# Patient Record
Sex: MALE | Race: WHITE | URBAN - METROPOLITAN AREA
[De-identification: names, ages, dates, MRNs, and addresses within clinical notes are randomized per-mention and may not be internally consistent; named-entity substitution may affect disease eponyms.]

---

## 2018-01-27 PROCEDURE — 99282 EMERGENCY DEPT VISIT SF MDM: CPT

## 2018-01-28 ENCOUNTER — HOSPITAL ENCOUNTER (EMERGENCY)
Facility: CLINIC | Age: 58
Discharge: HOME | End: 2018-01-28
Attending: EMERGENCY MEDICINE | Admitting: EMERGENCY MEDICINE
Payer: COMMERCIAL

## 2018-01-28 ENCOUNTER — NURSE TRIAGE (OUTPATIENT)
Dept: NURSING | Facility: CLINIC | Age: 58
End: 2018-01-28

## 2018-01-28 ENCOUNTER — ANESTHESIA EVENT (OUTPATIENT)
Dept: SURGERY | Facility: CLINIC | Age: 58
End: 2018-01-28
Payer: COMMERCIAL

## 2018-01-28 ENCOUNTER — HOSPITAL ENCOUNTER (OUTPATIENT)
Facility: CLINIC | Age: 58
Setting detail: OBSERVATION
Discharge: HOME | End: 2018-01-29
Attending: EMERGENCY MEDICINE | Admitting: UROLOGY
Payer: COMMERCIAL

## 2018-01-28 ENCOUNTER — ANESTHESIA (OUTPATIENT)
Dept: SURGERY | Facility: CLINIC | Age: 58
End: 2018-01-28
Payer: COMMERCIAL

## 2018-01-28 VITALS
OXYGEN SATURATION: 98 % | TEMPERATURE: 96.8 F | BODY MASS INDEX: 28 KG/M2 | SYSTOLIC BLOOD PRESSURE: 122 MMHG | DIASTOLIC BLOOD PRESSURE: 84 MMHG | WEIGHT: 200 LBS | RESPIRATION RATE: 16 BRPM | HEIGHT: 71 IN

## 2018-01-28 DIAGNOSIS — S39.840A PENILE FRACTURE, INITIAL ENCOUNTER: Primary | ICD-10-CM

## 2018-01-28 DIAGNOSIS — S39.94XA PENIS INJURY, INITIAL ENCOUNTER: ICD-10-CM

## 2018-01-28 DIAGNOSIS — S39.840A: ICD-10-CM

## 2018-01-28 LAB
ALBUMIN UR-MCNC: NEGATIVE MG/DL
ANION GAP SERPL CALCULATED.3IONS-SCNC: 5 MMOL/L (ref 3–14)
APPEARANCE UR: CLEAR
BASOPHILS # BLD AUTO: 0 10E9/L (ref 0–0.2)
BASOPHILS NFR BLD AUTO: 0.6 %
BILIRUB UR QL STRIP: NEGATIVE
BUN SERPL-MCNC: 20 MG/DL (ref 7–30)
CALCIUM SERPL-MCNC: 8.7 MG/DL (ref 8.5–10.1)
CHLORIDE SERPL-SCNC: 106 MMOL/L (ref 94–109)
CO2 SERPL-SCNC: 29 MMOL/L (ref 20–32)
COLOR UR AUTO: YELLOW
CREAT SERPL-MCNC: 0.93 MG/DL (ref 0.66–1.25)
DIFFERENTIAL METHOD BLD: NORMAL
EOSINOPHIL # BLD AUTO: 0.2 10E9/L (ref 0–0.7)
EOSINOPHIL NFR BLD AUTO: 2.2 %
ERYTHROCYTE [DISTWIDTH] IN BLOOD BY AUTOMATED COUNT: 12.7 % (ref 10–15)
GFR SERPL CREATININE-BSD FRML MDRD: 83 ML/MIN/1.7M2
GLUCOSE SERPL-MCNC: 98 MG/DL (ref 70–99)
GLUCOSE UR STRIP-MCNC: NEGATIVE MG/DL
HCT VFR BLD AUTO: 44 % (ref 40–53)
HGB BLD-MCNC: 15.2 G/DL (ref 13.3–17.7)
HGB UR QL STRIP: NEGATIVE
IMM GRANULOCYTES # BLD: 0 10E9/L (ref 0–0.4)
IMM GRANULOCYTES NFR BLD: 0.1 %
INR PPP: 0.95 (ref 0.86–1.14)
INTERPRETATION ECG - MUSE: NORMAL
KETONES UR STRIP-MCNC: ABNORMAL MG/DL
LEUKOCYTE ESTERASE UR QL STRIP: NEGATIVE
LYMPHOCYTES # BLD AUTO: 2.9 10E9/L (ref 0.8–5.3)
LYMPHOCYTES NFR BLD AUTO: 40.5 %
MCH RBC QN AUTO: 31.3 PG (ref 26.5–33)
MCHC RBC AUTO-ENTMCNC: 34.5 G/DL (ref 31.5–36.5)
MCV RBC AUTO: 91 FL (ref 78–100)
MONOCYTES # BLD AUTO: 0.6 10E9/L (ref 0–1.3)
MONOCYTES NFR BLD AUTO: 8.5 %
NEUTROPHILS # BLD AUTO: 3.4 10E9/L (ref 1.6–8.3)
NEUTROPHILS NFR BLD AUTO: 48.1 %
NITRATE UR QL: NEGATIVE
NRBC # BLD AUTO: 0 10*3/UL
NRBC BLD AUTO-RTO: 0 /100
PH UR STRIP: 6.5 PH (ref 5–7)
PLATELET # BLD AUTO: 293 10E9/L (ref 150–450)
POTASSIUM SERPL-SCNC: 4.1 MMOL/L (ref 3.4–5.3)
RBC # BLD AUTO: 4.85 10E12/L (ref 4.4–5.9)
SODIUM SERPL-SCNC: 140 MMOL/L (ref 133–144)
SOURCE: ABNORMAL
SP GR UR STRIP: 1.02 (ref 1–1.03)
UROBILINOGEN UR STRIP-ACNC: 1 EU/DL (ref 0.2–1)
WBC # BLD AUTO: 7.1 10E9/L (ref 4–11)

## 2018-01-28 PROCEDURE — 40000065 ZZH STATISTIC EKG NON-CHARGEABLE

## 2018-01-28 PROCEDURE — 36000063 ZZH SURGERY LEVEL 4 EA 15 ADDTL MIN: Performed by: UROLOGY

## 2018-01-28 PROCEDURE — 37000009 ZZH ANESTHESIA TECHNICAL FEE, EACH ADDTL 15 MIN: Performed by: UROLOGY

## 2018-01-28 PROCEDURE — 25000125 ZZHC RX 250: Performed by: NURSE ANESTHETIST, CERTIFIED REGISTERED

## 2018-01-28 PROCEDURE — 85025 COMPLETE CBC W/AUTO DIFF WBC: CPT | Performed by: EMERGENCY MEDICINE

## 2018-01-28 PROCEDURE — 25000128 H RX IP 250 OP 636: Performed by: UROLOGY

## 2018-01-28 PROCEDURE — 99220 ZZC INITIAL OBSERVATION CARE,LEVL III: CPT | Performed by: INTERNAL MEDICINE

## 2018-01-28 PROCEDURE — 99285 EMERGENCY DEPT VISIT HI MDM: CPT

## 2018-01-28 PROCEDURE — 25000128 H RX IP 250 OP 636: Performed by: ANESTHESIOLOGY

## 2018-01-28 PROCEDURE — 71000012 ZZH RECOVERY PHASE 1 LEVEL 1 FIRST HR: Performed by: UROLOGY

## 2018-01-28 PROCEDURE — 25000566 ZZH SEVOFLURANE, EA 15 MIN: Performed by: UROLOGY

## 2018-01-28 PROCEDURE — 25000128 H RX IP 250 OP 636: Performed by: NURSE ANESTHETIST, CERTIFIED REGISTERED

## 2018-01-28 PROCEDURE — 80048 BASIC METABOLIC PNL TOTAL CA: CPT | Performed by: EMERGENCY MEDICINE

## 2018-01-28 PROCEDURE — G0378 HOSPITAL OBSERVATION PER HR: HCPCS

## 2018-01-28 PROCEDURE — 36000093 ZZH SURGERY LEVEL 4 1ST 30 MIN: Performed by: UROLOGY

## 2018-01-28 PROCEDURE — 27210995 ZZH RX 272: Performed by: UROLOGY

## 2018-01-28 PROCEDURE — 40000169 ZZH STATISTIC PRE-PROCEDURE ASSESSMENT I: Performed by: UROLOGY

## 2018-01-28 PROCEDURE — 27210794 ZZH OR GENERAL SUPPLY STERILE: Performed by: UROLOGY

## 2018-01-28 PROCEDURE — 81003 URINALYSIS AUTO W/O SCOPE: CPT | Performed by: UROLOGY

## 2018-01-28 PROCEDURE — 37000008 ZZH ANESTHESIA TECHNICAL FEE, 1ST 30 MIN: Performed by: UROLOGY

## 2018-01-28 PROCEDURE — 93005 ELECTROCARDIOGRAM TRACING: CPT

## 2018-01-28 PROCEDURE — 85610 PROTHROMBIN TIME: CPT | Performed by: EMERGENCY MEDICINE

## 2018-01-28 PROCEDURE — 25000125 ZZHC RX 250: Performed by: UROLOGY

## 2018-01-28 RX ORDER — NALOXONE HYDROCHLORIDE 0.4 MG/ML
.1-.4 INJECTION, SOLUTION INTRAMUSCULAR; INTRAVENOUS; SUBCUTANEOUS
Status: DISCONTINUED | OUTPATIENT
Start: 2018-01-28 | End: 2018-01-29

## 2018-01-28 RX ORDER — SODIUM CHLORIDE, SODIUM LACTATE, POTASSIUM CHLORIDE, CALCIUM CHLORIDE 600; 310; 30; 20 MG/100ML; MG/100ML; MG/100ML; MG/100ML
INJECTION, SOLUTION INTRAVENOUS CONTINUOUS PRN
Status: DISCONTINUED | OUTPATIENT
Start: 2018-01-28 | End: 2018-01-28

## 2018-01-28 RX ORDER — LABETALOL HYDROCHLORIDE 5 MG/ML
10 INJECTION, SOLUTION INTRAVENOUS
Status: DISCONTINUED | OUTPATIENT
Start: 2018-01-28 | End: 2018-01-28 | Stop reason: HOSPADM

## 2018-01-28 RX ORDER — ONDANSETRON 4 MG/1
4 TABLET, ORALLY DISINTEGRATING ORAL EVERY 6 HOURS PRN
Status: DISCONTINUED | OUTPATIENT
Start: 2018-01-28 | End: 2018-01-29 | Stop reason: HOSPADM

## 2018-01-28 RX ORDER — AMOXICILLIN 250 MG
1 CAPSULE ORAL 2 TIMES DAILY PRN
Status: DISCONTINUED | OUTPATIENT
Start: 2018-01-28 | End: 2018-01-29 | Stop reason: HOSPADM

## 2018-01-28 RX ORDER — AMOXICILLIN 250 MG
2 CAPSULE ORAL 2 TIMES DAILY PRN
Status: DISCONTINUED | OUTPATIENT
Start: 2018-01-28 | End: 2018-01-29 | Stop reason: HOSPADM

## 2018-01-28 RX ORDER — PROCHLORPERAZINE 25 MG
25 SUPPOSITORY, RECTAL RECTAL EVERY 12 HOURS PRN
Status: DISCONTINUED | OUTPATIENT
Start: 2018-01-28 | End: 2018-01-29 | Stop reason: HOSPADM

## 2018-01-28 RX ORDER — ONDANSETRON 4 MG/1
4 TABLET, ORALLY DISINTEGRATING ORAL EVERY 6 HOURS PRN
Status: DISCONTINUED | OUTPATIENT
Start: 2018-01-28 | End: 2018-01-28

## 2018-01-28 RX ORDER — PROPOFOL 10 MG/ML
INJECTION, EMULSION INTRAVENOUS PRN
Status: DISCONTINUED | OUTPATIENT
Start: 2018-01-28 | End: 2018-01-28

## 2018-01-28 RX ORDER — GINSENG 100 MG
CAPSULE ORAL PRN
Status: DISCONTINUED | OUTPATIENT
Start: 2018-01-28 | End: 2018-01-28 | Stop reason: HOSPADM

## 2018-01-28 RX ORDER — ONDANSETRON 2 MG/ML
4 INJECTION INTRAMUSCULAR; INTRAVENOUS EVERY 6 HOURS PRN
Status: DISCONTINUED | OUTPATIENT
Start: 2018-01-28 | End: 2018-01-28

## 2018-01-28 RX ORDER — FENTANYL CITRATE 50 UG/ML
INJECTION, SOLUTION INTRAMUSCULAR; INTRAVENOUS PRN
Status: DISCONTINUED | OUTPATIENT
Start: 2018-01-28 | End: 2018-01-28

## 2018-01-28 RX ORDER — LIDOCAINE HYDROCHLORIDE 20 MG/ML
INJECTION, SOLUTION INFILTRATION; PERINEURAL PRN
Status: DISCONTINUED | OUTPATIENT
Start: 2018-01-28 | End: 2018-01-28

## 2018-01-28 RX ORDER — OXYCODONE AND ACETAMINOPHEN 5; 325 MG/1; MG/1
1-2 TABLET ORAL EVERY 4 HOURS PRN
Status: DISCONTINUED | OUTPATIENT
Start: 2018-01-28 | End: 2018-01-29 | Stop reason: HOSPADM

## 2018-01-28 RX ORDER — MAGNESIUM HYDROXIDE 1200 MG/15ML
LIQUID ORAL PRN
Status: DISCONTINUED | OUTPATIENT
Start: 2018-01-28 | End: 2018-01-28 | Stop reason: HOSPADM

## 2018-01-28 RX ORDER — NALOXONE HYDROCHLORIDE 0.4 MG/ML
.1-.4 INJECTION, SOLUTION INTRAMUSCULAR; INTRAVENOUS; SUBCUTANEOUS
Status: DISCONTINUED | OUTPATIENT
Start: 2018-01-28 | End: 2018-01-28

## 2018-01-28 RX ORDER — NALOXONE HYDROCHLORIDE 0.4 MG/ML
.1-.4 INJECTION, SOLUTION INTRAMUSCULAR; INTRAVENOUS; SUBCUTANEOUS
Status: DISCONTINUED | OUTPATIENT
Start: 2018-01-28 | End: 2018-01-29 | Stop reason: HOSPADM

## 2018-01-28 RX ORDER — ONDANSETRON 2 MG/ML
4 INJECTION INTRAMUSCULAR; INTRAVENOUS EVERY 30 MIN PRN
Status: DISCONTINUED | OUTPATIENT
Start: 2018-01-28 | End: 2018-01-28 | Stop reason: HOSPADM

## 2018-01-28 RX ORDER — FENTANYL CITRATE 0.05 MG/ML
25-50 INJECTION, SOLUTION INTRAMUSCULAR; INTRAVENOUS EVERY 5 MIN PRN
Status: DISCONTINUED | OUTPATIENT
Start: 2018-01-28 | End: 2018-01-28 | Stop reason: HOSPADM

## 2018-01-28 RX ORDER — OXYCODONE AND ACETAMINOPHEN 5; 325 MG/1; MG/1
1-2 TABLET ORAL EVERY 4 HOURS PRN
Status: DISCONTINUED | OUTPATIENT
Start: 2018-01-28 | End: 2018-01-28

## 2018-01-28 RX ORDER — ONDANSETRON 4 MG/1
4 TABLET, ORALLY DISINTEGRATING ORAL EVERY 30 MIN PRN
Status: DISCONTINUED | OUTPATIENT
Start: 2018-01-28 | End: 2018-01-28 | Stop reason: HOSPADM

## 2018-01-28 RX ORDER — DEXAMETHASONE SODIUM PHOSPHATE 4 MG/ML
INJECTION, SOLUTION INTRA-ARTICULAR; INTRALESIONAL; INTRAMUSCULAR; INTRAVENOUS; SOFT TISSUE PRN
Status: DISCONTINUED | OUTPATIENT
Start: 2018-01-28 | End: 2018-01-28

## 2018-01-28 RX ORDER — LIDOCAINE 40 MG/G
CREAM TOPICAL
Status: DISCONTINUED | OUTPATIENT
Start: 2018-01-28 | End: 2018-01-29 | Stop reason: HOSPADM

## 2018-01-28 RX ORDER — HYDROMORPHONE HYDROCHLORIDE 1 MG/ML
0.2 INJECTION, SOLUTION INTRAMUSCULAR; INTRAVENOUS; SUBCUTANEOUS
Status: DISCONTINUED | OUTPATIENT
Start: 2018-01-28 | End: 2018-01-29 | Stop reason: HOSPADM

## 2018-01-28 RX ORDER — BUPIVACAINE HYDROCHLORIDE 2.5 MG/ML
INJECTION, SOLUTION INFILTRATION; PERINEURAL PRN
Status: DISCONTINUED | OUTPATIENT
Start: 2018-01-28 | End: 2018-01-28 | Stop reason: HOSPADM

## 2018-01-28 RX ORDER — SODIUM CHLORIDE, SODIUM LACTATE, POTASSIUM CHLORIDE, CALCIUM CHLORIDE 600; 310; 30; 20 MG/100ML; MG/100ML; MG/100ML; MG/100ML
INJECTION, SOLUTION INTRAVENOUS CONTINUOUS
Status: DISCONTINUED | OUTPATIENT
Start: 2018-01-28 | End: 2018-01-28 | Stop reason: HOSPADM

## 2018-01-28 RX ORDER — ONDANSETRON 2 MG/ML
INJECTION INTRAMUSCULAR; INTRAVENOUS PRN
Status: DISCONTINUED | OUTPATIENT
Start: 2018-01-28 | End: 2018-01-28

## 2018-01-28 RX ORDER — PROCHLORPERAZINE MALEATE 10 MG
10 TABLET ORAL EVERY 6 HOURS PRN
Status: DISCONTINUED | OUTPATIENT
Start: 2018-01-28 | End: 2018-01-29 | Stop reason: HOSPADM

## 2018-01-28 RX ORDER — ONDANSETRON 2 MG/ML
4 INJECTION INTRAMUSCULAR; INTRAVENOUS EVERY 6 HOURS PRN
Status: DISCONTINUED | OUTPATIENT
Start: 2018-01-28 | End: 2018-01-29 | Stop reason: HOSPADM

## 2018-01-28 RX ORDER — HYDROMORPHONE HYDROCHLORIDE 1 MG/ML
.3-.5 INJECTION, SOLUTION INTRAMUSCULAR; INTRAVENOUS; SUBCUTANEOUS EVERY 5 MIN PRN
Status: DISCONTINUED | OUTPATIENT
Start: 2018-01-28 | End: 2018-01-28 | Stop reason: HOSPADM

## 2018-01-28 RX ORDER — HYDROMORPHONE HYDROCHLORIDE 1 MG/ML
.3-.5 INJECTION, SOLUTION INTRAMUSCULAR; INTRAVENOUS; SUBCUTANEOUS
Status: DISCONTINUED | OUTPATIENT
Start: 2018-01-28 | End: 2018-01-28

## 2018-01-28 RX ADMIN — FENTANYL CITRATE 50 MCG: 50 INJECTION, SOLUTION INTRAMUSCULAR; INTRAVENOUS at 20:17

## 2018-01-28 RX ADMIN — ONDANSETRON 4 MG: 2 INJECTION INTRAMUSCULAR; INTRAVENOUS at 20:25

## 2018-01-28 RX ADMIN — HYDROMORPHONE HYDROCHLORIDE 0.5 MG: 1 INJECTION, SOLUTION INTRAMUSCULAR; INTRAVENOUS; SUBCUTANEOUS at 20:43

## 2018-01-28 RX ADMIN — SUCCINYLCHOLINE CHLORIDE 100 MG: 20 INJECTION, SOLUTION INTRAMUSCULAR; INTRAVENOUS at 20:17

## 2018-01-28 RX ADMIN — FENTANYL CITRATE 50 MCG: 50 INJECTION, SOLUTION INTRAMUSCULAR; INTRAVENOUS at 20:28

## 2018-01-28 RX ADMIN — PROPOFOL 200 MG: 10 INJECTION, EMULSION INTRAVENOUS at 20:17

## 2018-01-28 RX ADMIN — CEFAZOLIN SODIUM 2 G: 2 SOLUTION INTRAVENOUS at 20:19

## 2018-01-28 RX ADMIN — DEXMEDETOMIDINE HYDROCHLORIDE 12 MCG: 100 INJECTION, SOLUTION INTRAVENOUS at 21:33

## 2018-01-28 RX ADMIN — LIDOCAINE HYDROCHLORIDE 100 MG: 20 INJECTION, SOLUTION INFILTRATION; PERINEURAL at 20:17

## 2018-01-28 RX ADMIN — SODIUM CHLORIDE, POTASSIUM CHLORIDE, SODIUM LACTATE AND CALCIUM CHLORIDE: 600; 310; 30; 20 INJECTION, SOLUTION INTRAVENOUS at 20:02

## 2018-01-28 RX ADMIN — MIDAZOLAM 2 MG: 1 INJECTION INTRAMUSCULAR; INTRAVENOUS at 19:59

## 2018-01-28 RX ADMIN — SODIUM CHLORIDE, POTASSIUM CHLORIDE, SODIUM LACTATE AND CALCIUM CHLORIDE: 600; 310; 30; 20 INJECTION, SOLUTION INTRAVENOUS at 21:01

## 2018-01-28 RX ADMIN — DEXAMETHASONE SODIUM PHOSPHATE 4 MG: 4 INJECTION, SOLUTION INTRA-ARTICULAR; INTRALESIONAL; INTRAMUSCULAR; INTRAVENOUS; SOFT TISSUE at 20:25

## 2018-01-28 ASSESSMENT — LIFESTYLE VARIABLES: TOBACCO_USE: 1

## 2018-01-28 NOTE — PLAN OF CARE
RECEIVING UNIT ED HANDOFF REVIEW    ED Nurse Handoff Report was reviewed by: Hailey Stein on January 28, 2018 at 5:39 PM

## 2018-01-28 NOTE — ED NOTES
"[facility]  ED Nurse Handoff Report    ED Chief complaint: Groin Swelling (here this morning for the same thing. )      ED Diagnosis:   Final diagnoses:   Penile fracture       Code Status: Full Code    Allergies: No Known Allergies    Activity level - Baseline/Home:  Independent    Activity Level - Current:   Independent     Needed?: No    Isolation: No  Infection: Not Applicable    Bariatric?: No    Vital Signs:   Vitals:    01/28/18 1328   BP: (!) 161/98   Pulse: 77   Resp: 18   Temp: 98.2  F (36.8  C)   TempSrc: Oral   SpO2: 98%   Weight: 90.7 kg (200 lb)   Height: 1.803 m (5' 11\")       Cardiac Rhythm: ,        Pain level: 0-10 Pain Scale: 1    Is this patient confused?: No    Patient Report: Initial Complaint: Abigail Whitley is a 57 year old male with a history of HTN and ingunial hernia who presents to the emergency department for evaluation of groin swelling and penial pain. At approximately 2230 last night, the patient reports he was having intercourse with his girlfriend. While having an erection with his partner on top, the patient reports his penis bent. He states the immediate onset of penial pain and swelling and says they stopped intercourse immediately. This event prompted the patient to seek evaluation here in the ED last night. During that visit, the patient was treated for pain and giving information to follow up with urology. This morning the patient reports he tried calling the urologist and learned he could not be seen until Monday at the earliest. Having read online that his diagnosis should be surgically corrected within 48 hours, the patient decided to seek further evaluation here in the emergency department.        Here, the patient continues to complain of penial pain and swelling. He denies having an erection since the event last night. He reports he periodically uses Viagra for \"insurance\" but denies needing to rely on it. He denies the use of blood thinners " and other major medical issues.   Focused Assessment: reports groin area pain, lungs clear, A/O x4  Tests Performed: labs, eval by urologist  Abnormal Results:   Results for orders placed or performed during the hospital encounter of 01/28/18 (from the past 24 hour(s))   *UA reflex to Microscopic   Result Value Ref Range    Color Urine Yellow     Appearance Urine Clear     Glucose Urine Negative NEG^Negative mg/dL    Bilirubin Urine Negative NEG^Negative    Ketones Urine Trace (A) NEG^Negative mg/dL    Specific Gravity Urine 1.025 1.003 - 1.035    Blood Urine Negative NEG^Negative    pH Urine 6.5 5.0 - 7.0 pH    Protein Albumin Urine Negative NEG^Negative mg/dL    Urobilinogen Urine 1.0 0.2 - 1.0 EU/dL    Nitrite Urine Negative NEG^Negative    Leukocyte Esterase Urine Negative NEG^Negative    Source Midstream Urine      Treatments provided: IV placed. 20 ga right AC. Plan is for patient to go to OBS first and then to surgery at 20:00    Family Comments: none here    OBS brochure/video discussed/provided to patient: Yes    ED Medications: Medications - No data to display    Drips infusing?:  No      ED NURSE PHONE NUMBER: *[id number]

## 2018-01-28 NOTE — ED NOTES
Pt has swelling and discoloration to the penis. In the past few minutes pt was able to urinate without difficulty states the urine color was normal. MD at bedside for exam. Pt instructed to follow-up on Monday with urology and return if swelling or difficulty urinating occurs.

## 2018-01-28 NOTE — ED PROVIDER NOTES
"  History     Chief Complaint:  Groin Swelling    HPI   Abigail Whitley is a 57 year old male with a history of HTN and inguinal hernia who presents to the emergency department for evaluation of groin swelling and penial pain. At approximately 2230 last night, the patient reports he was having intercourse with his girlfriend. While having an erection with his partner on top, the patient reports his penis bent. He states the immediate onset of penial pain and swelling and says they stopped intercourse immediately. This event prompted the patient to seek evaluation here in the ED last night. During that visit, the patient was treated for pain and giving information to follow up with urology. This morning the patient reports he tried calling the urologist and learned he could not be seen until Monday at the earliest. Having read online that his diagnosis should be surgically corrected within 48 hours, the patient decided to seek further evaluation here in the emergency department.       Here, the patient continues to complain of penial pain and swelling. He denies having an erection since the event last night. He reports he periodically uses Viagra for \"insurance\" but denies needing to rely on it. He denies the use of blood thinners and other major medical issues.     Allergies:  NKDA     Medications:    Norco    Past Medical History:    HTN  Inguinal hernia unilateral   Rotator cuff issues    Past Surgical History:    Orthopedic surgery  Tonsillectomy    Family History:    No past pertinent family history.    Social History:  Former smoker  Negative for alcohol use.  Marital Status:  Single      Review of Systems   Genitourinary: Positive for penile pain and penile swelling.   All other systems reviewed and are negative.    Physical Exam   First Vitals:  BP: (!) 161/98  Pulse: 77  Heart Rate: 77  Temp: 98.2  F (36.8  C)  Resp: 18  Height: 180.3 cm (5' 11\")  Weight: 90.7 kg (200 lb)  SpO2: 98 %      Physical Exam   Gen: " Pleasant, appears stated age.    Eye:   Pupils are equal, round, and reactive.     Sclera non-injected.    ENT:   Moist mucus membranes.     Normal tongue.    Oropharynx without lesions.    Cardiac:     Normal rate and regular rhythm.    No murmurs, gallops, or rubs.    Pulmonary:     Clear to auscultation bilaterally.    No wheezes, rales, or rhonchi.    Abdomen:     Normal active bowel sounds.     Abdomen is soft and non-distended, without focal tenderness.    :   Penis: extensive bruising involving entire shaft of penis, extending to pubis.  Tender to palpation.  No blood at the meatus.  Scrotum: No hernia. No lesions. No scrotal tenderness to palpation.  Bruising extends to testicles. Normal testicular lie.     Musculoskeletal:     Normal movement of all extremities without evidence for deficit.    Extremities:    No edema.    Skin:   Warm and dry.    Neurologic:    Non-focal exam without asymmetric weakness or numbness.    Normal tone    Psychiatric:     Normal affect with appropriate interaction with examiner.      Emergency Department Course   ECG:   Indication: penial pain and groin swelling  Time: 1724  Vent. Rate 57 bpm. NJ interval 176. QRS duration 102. QT/QTc 434/422. P-R-T axis 29 -32 0. Sinus bradycardia. Left axis deviation. Moderate voltage criteria for LVH, may be normal variant. Abnormal ECG> Read time: 1746.    Laboratory:  CBC: WBC: 7.1, HGB: 15.2, PLT: 2936  BMP:All WNL (Creatinine: 0.93)  UA with micro: ketones trace, o/w negative  INR: 0.95    Emergency Department Course:  1400 Nursing notes and vitals reviewed.  I performed an exam of the patient as documented above.     Blood drawn. This was sent to the lab for further testing, results above.    1426 I consulted with urologist, Dr. Sung regarding the patient's history and presentation here in the emergency department.    1456 I rechecked the patient and discussed the results of his workup thus far.     1501 I consulted with the  urologist, Dr. Sung again.     1636  I consulted with Dr. Alvarez of the hospitalist services. They are in agreement to accept the patient for admission.    1724 EKG obtained in the ED, see results above.     Findings and plan explained to the Patient who consents to admission. Discussed the patient with Dr. Alvarez, who will admit the patient for observation following surgery performed by Dr. Sung.     Impression & Plan    Medical Decision Making:  Abigail Whitley is a 57 year old male without other medical problems who presents today after a penial injury. On exam, the patient has extensive swelling involving the shaft of the penis. He has been able to urinate without difficulty. The concern would be an injury to the tunica albuginea. The patient was evaluated in the ED at the bedside by Dr. Sung. They discussed conservative options versus surgical exploration. Dr. Sung plans to perform surgical exploration tonight. Plan to take the patient to the OR at 2000. Until then, he will be kept NPO. Dr. Sung recommends observation overnight.       Diagnosis:    ICD-10-CM    1. Penile fracture S39.840A CBC with platelets differential     *UA reflex to Microscopic     *UA reflex to Microscopic       Disposition:  Admitted to observation following surgery with Dr. Sung at 2000    I, Ignacia Marsh, am serving as a scribe on 1/28/2018 at 1:55 PM to personally document services performed by Edith Oneil MD based on my observations and the provider's statements to me.     Ignacia Marsh  1/28/2018    EMERGENCY DEPARTMENT       Edith Oneil MD  01/29/18 0015

## 2018-01-28 NOTE — TELEPHONE ENCOUNTER
"Patient states he was seen at ED 1/17/18 with a \"fractured penis\" and it is more swollen today.  Per ED notes, patient will return to ED.  Reason for Disposition    Swollen penis or visible bruising of penis    Additional Information    Followed a genital area injury    Protocols used: GENITAL INJURY - MALE-ADULT-, PENIS AND SCROTUM SYMPTOMS-ADULT-AH    "

## 2018-01-28 NOTE — IP AVS SNAPSHOT
MRN:[id number]                      After Visit Summary   1/28/2018    Abigail Whitley    MRN: [id number]           Thank you!     Thank you for choosing [facility] for your care. Our goal is always to provide you with excellent care. Hearing back from our patients is one way we can continue to improve our services. Please take a few minutes to complete the written survey that you may receive in the mail after you visit with us. Thank you!        Patient Information     Date Of Birth          1960        Designated Caregiver       Most Recent Value    Caregiver    Name of designated caregiver Joan Robb    Phone number of caregiver [phone]      About your hospital stay     You were admitted on:  January 28, 2018 You last received care in the:  [facility]    You were discharged on:  January 29, 2018       Who to Call     For medical emergencies, please call 911.  For non-urgent questions about your medical care, please call your primary care provider or clinic, [phone]  For questions related to your surgery, please call your surgery clinic        Attending Provider     Provider Specialty    Edith Oneil MD Emergency Medicine    AntonioArie carter MD Internal Medicine    Jean-Claude Garcia MD Urology       Primary Care Provider Office Phone # Fax #    Caesar Rasheed [phone]      After Care Instructions     Discharge Instructions       Patient to arrange for follow up appointment in 2 days. SEE DR GARCIA ON WED , RADHA OFFICE TO REMOVE DRESSINGS. CALL [phone]            Dressing       Keep dressing clean and dry, change as instructed by Surgeon or RN. WILL REMOVE ON WED.RADHA OFFICE            No lifting over 15 pounds and no strenuous physical activity       for 2weeks                  Pending Results     No orders found for last 3 day(s).            Admission Information     Date & Time Provider Department Dept. Phone    1/28/2018 Jean-Claude Garcia  [facility] [phone]      Your Vitals Were     Blood Pressure Pulse Temperature Respirations Height Weight    129/78 (BP Location: Left arm) 77 96.7  F (35.9  C) (Oral) 18 1.803 m (5' 11\") 90.7 kg (200 lb)    Pulse Oximetry BMI (Body Mass Index)                93% 27.89 kg/m2          [employer] Information     [employer] lets you send messages to your doctor, view your test results, renew your prescriptions, schedule appointments and more. To sign up, go to [email or web address] . Click on \"Log in\" on the left side of the screen, which will take you to the Welcome page. Then click on \"Sign up Now\" on the right side of the page.     You will be asked to enter the access code listed below, as well as some personal information. Please follow the directions to create your username and password.     Your access code is: [email or web address]  Expires: 2018 12:55 AM     Your access code will  in 90 days. If you need help or a new code, please call your [facility] clinic or [phone].        Care EveryWhere ID     This is your Care EveryWhere ID. This could be used by other organizations to access your [facility] medical records  [id number]        Equal Access to Services     LUIS IBRAHIM AH: Hadii deon willoughbyo Soanthonyali, waaxda luqadaha, qaybta kaalmada adeleanne, isael ramirez. So [facility] [phone].    ATENCIÓN: Si habla español, tiene a aquino disposición servicios gratuitos de asistencia lingüística. Abdiel al [phone].    We comply with applicable federal civil rights laws and [place] laws. We do not discriminate on the basis of race, color, national origin, age, disability, sex, sexual orientation, or gender identity.               Review of your medicines      START taking        Dose / Directions    cephALEXin 500 MG capsule   Commonly known as:  KEFLEX   Used for:  Penile fracture, initial encounter        Dose:  500 mg   Take 1 capsule (500 mg) by mouth 2 times " daily   Quantity:  14 capsule   Refills:  0       HYDROcodone-acetaminophen 5-325 MG per tablet   Commonly known as:  NORCO   Used for:  Penile fracture, initial encounter        Dose:  1-2 tablet   Take 1-2 tablets by mouth every 4 hours as needed for moderate to severe pain (Moderate to Severe Pain)   Quantity:  20 tablet   Refills:  0         CONTINUE these medicines which have NOT CHANGED        Dose / Directions    GLUCOSAMINE-CHONDROITIN PO        Dose:  1 tablet   Take 1 tablet by mouth 2 times daily   Refills:  0            Where to get your medicines      These medications were sent to [facility] - [address]  [address], [place] [address]     Phone:  [phone]     cephALEXin 500 MG capsule         Some of these will need a paper prescription and others can be bought over the counter. Ask your nurse if you have questions.     Bring a paper prescription for each of these medications     HYDROcodone-acetaminophen 5-325 MG per tablet                Protect others around you: Learn how to safely use, store and throw away your medicines at www.disposemymeds.org.        ANTIBIOTIC INSTRUCTION     You've Been Prescribed an Antibiotic - Now What?  Your healthcare team thinks that you or your loved one might have an infection. Some infections can be treated with antibiotics, which are powerful, life-saving drugs. Like all medications, antibiotics have side effects and should only be used when necessary. There are some important things you should know about your antibiotic treatment.      Your healthcare team may run tests before you start taking an antibiotic.    Your team may take samples (e.g., from your blood, urine or other areas) to run tests to look for bacteria. These test can be important to determine if you need an antibiotic at all and, if you do, which antibiotic will work best.      Within a few days, your healthcare team might change or even stop your  antibiotic.    Your team may start you on an antibiotic while they are working to find out what is making you sick.    Your team might change your antibiotic because test results show that a different antibiotic would be better to treat your infection.    In some cases, once your team has more information, they learn that you do not need an antibiotic at all. They may find out that you don't have an infection, or that the antibiotic you're taking won't work against your infection. For example, an infection caused by a virus can't be treated with antibiotics. Staying on an antibiotic when you don't need it is more likely to be harmful than helpful.      You may experience side effects from your antibiotic.    Like all medications, antibiotics have side effects. Some of these can be serious.    Let you healthcare team know if you have any known allergies when you are admitted to the hospital.    One significant side effect of nearly all antibiotics is the risk of severe and sometimes deadly diarrhea caused by Clostridium difficile (C. Difficile). This occurs when a person takes antibiotics because some good germs are destroyed. Antibiotic use allows C. diificile to take over, putting patients at high risk for this serious infection.    As a patient or caregiver, it is important to understand your or your loved one's antibiotic treatment. It is especially important for caregivers to speak up when patients can't speak for themselves. Here are some important questions to ask your healthcare team.    What infection is this antibiotic treating and how do you know I have that infection?    What side effects might occur from this antibiotic?    How long will I need to take this antibiotic?    Is it safe to take this antibiotic with other medications or supplements (e.g., vitamins) that I am taking?     Are there any special directions I need to know about taking this antibiotic? For example, should I take it with  food?    How will I be monitored to know whether my infection is responding to the antibiotic?    What tests may help to make sure the right antibiotic is prescribed for me?      Information provided by:  www.cdc.gov/getsmart  U.S. Department of Health and Human Services  Centers for disease Control and Prevention  National Center for Emerging and Zoonotic Infectious Diseases  Division of Healthcare Quality Promotion        Information about OPIOIDS     PRESCRIPTION OPIOIDS: WHAT YOU NEED TO KNOW    Prescription opioids can be used to help relieve moderate to severe pain and are often prescribed following a surgery or injury, or for certain health conditions. These medications can be an important part of treatment but also come with serious risks. It is important to work with your health care provider to make sure you are getting the safest, most effective care.    WHAT ARE THE RISKS AND SIDE EFFECTS OF OPIOID USE?  Prescription opioids carry serious risks of addiction and overdose, especially with prolonged use. An opioid overdose, often marked by slowed breathing can cause sudden death. The use of prescription opioids can have a number of side effects as well, even when taken as directed:      Tolerance - meaning you might need to take more of a medication for the same pain relief    Physical dependence - meaning you have symptoms of withdrawal when a medication is stopped    Increased sensitivity to pain    Constipation    Nausea, vomiting, and dry mouth    Sleepiness and dizziness    Confusion    Depression    Low levels of testosterone that can result in lower sex drive, energy, and strength    Itching and sweating    RISKS ARE GREATER WITH:    History of drug misuse, substance use disorder, or overdose    Mental health conditions (such as depression or anxiety)    Sleep apnea    Older age (65 years or older)    Pregnancy    Avoid alcohol while taking prescription opioids.   Also, unless specifically advised by  your health care provider, medications to avoid include:    Benzodiazepines (such as Xanax or Valium)    Muscle relaxants (such as Soma or Flexeril)    Hypnotics (such as Ambien or Lunesta)    Other prescription opioids    KNOW YOUR OPTIONS:  Talk to your health care provider about ways to manage your pain that do not involve prescription opioids. Some of these options may actually work better and have fewer risks and side effects:    Pain relievers such as acetaminophen, ibuprofen, and naproxen    Some medications that are also used for depression or seizures    Physical therapy and exercise    Cognitive behavioral therapy, a psychological, goal-directed approach, in which patients learn how to modify physical, behavioral, and emotional triggers of pain and stress    IF YOU ARE PRESCRIBED OPIOIDS FOR PAIN:    Never take opioids in greater amounts or more often than prescribed    Follow up with your primary health care provider and work together to create a plan on how to manage your pain.    Talk about ways to help manage your pain that do not involve prescription opioids    Talk about all concerns and side effects    Help prevent misuse and abuse    Never sell or share prescription opioids    Never use another person's prescription opioids    Store prescription opioids in a secure place and out of reach of others (this may include visitors, children, friends, and family)    Visit www.cdc.gov/drugoverdose to learn about risks of opioid abuse and overdose    If you believe you may be struggling with addiction, tell your health care provider and ask for guidance or call [facility]'s National Helpline at 4-611-668-HELP    LEARN MORE / www.cdc.gov/drugoverdose/prescribing/guideline.html    Safely dispose of unused prescription opioids: Find your local drug take-back programs and more information about the importance of safe disposal at www.doseofreality.[place].gov             Medication List: This is a list of all your  medications and when to take them. Check marks below indicate your daily home schedule. Keep this list as a reference.      Medications           Morning Afternoon Evening Bedtime As Needed    cephALEXin 500 MG capsule   Commonly known as:  KEFLEX   Take 1 capsule (500 mg) by mouth 2 times daily   Next Dose Due:  As prescribed, start today twice a day                                      GLUCOSAMINE-CHONDROITIN PO   Take 1 tablet by mouth 2 times daily   Next Dose Due:  Resume taking as you do at home                                        HYDROcodone-acetaminophen 5-325 MG per tablet   Commonly known as:  NORCO   Take 1-2 tablets by mouth every 4 hours as needed for moderate to severe pain (Moderate to Severe Pain)   Next Dose Due:  As needed                                             More Information        Discharge Instructions: After Your Surgery  You ve just had surgery. During surgery, you were given medicine called anesthesia to keep you relaxed and free of pain. After surgery, you may have some pain or nausea. This is common. Here are some tips for feeling better and getting well after surgery.     Stay on schedule with your medicine.   Going home  Your healthcare provider will show you how to take care of yourself when you go home. He or she will also answer your questions. Have an adult family member or friend drive you home. For the first 24 hours after your surgery:    Do not drive or use heavy equipment.    Do not make important decisions or sign legal papers.    Do not drink alcohol.    Have someone stay with you, if needed. He or she can watch for problems and help keep you safe.  Be sure to go to all follow-up visits with your healthcare provider. And rest after your surgery for as long as your healthcare provider tells you to.  Coping with pain  If you have pain after surgery, pain medicine will help you feel better. Take it as told, before pain becomes severe. Also, ask your healthcare provider  or pharmacist about other ways to control pain. This might be with heat, ice, or relaxation. And follow any other instructions your surgeon or nurse gives you.  Tips for taking pain medicine  To get the best relief possible, remember these points:    Pain medicines can upset your stomach. Taking them with a little food may help.    Most pain relievers taken by mouth need at least 20 to 30 minutes to start to work.    Taking medicine on a schedule can help you remember to take it. Try to time your medicine so that you can take it before starting an activity. This might be before you get dressed, go for a walk, or sit down for dinner.    Constipation is a common side effect of pain medicines. Call your healthcare provider before taking any medicines such as laxatives or stool softeners to help ease constipation. Also ask if you should skip any foods. Drinking lots of fluids and eating foods such as fruits and vegetables that are high in fiber can also help. Remember, do not take laxatives unless your surgeon has prescribed them.    Drinking alcohol and taking pain medicine can cause dizziness and slow your breathing. It can even be deadly. Do not drink alcohol while taking pain medicine.    Pain medicine can make you react more slowly to things. Do not drive or run machinery while taking pain medicine.  Your healthcare provider may tell you to take acetaminophen to help ease your pain. Ask him or her how much you are supposed to take each day. Acetaminophen or other pain relievers may interact with your prescription medicines or other over-the-counter (OTC) medicines. Some prescription medicines have acetaminophen and other ingredients. Using both prescription and OTC acetaminophen for pain can cause you to overdose. Read the labels on your OTC medicines with care. This will help you to clearly know the list of ingredients, how much to take, and any warnings. It may also help you not take too much acetaminophen. If  you have questions or do not understand the information, ask your pharmacist or healthcare provider to explain it to you before you take the OTC medicine.  Managing nausea  Some people have an upset stomach after surgery. This is often because of anesthesia, pain, or pain medicine, or the stress of surgery. These tips will help you handle nausea and eat healthy foods as you get better. If you were on a special food plan before surgery, ask your healthcare provider if you should follow it while you get better. These tips may help:    Do not push yourself to eat. Your body will tell you when to eat and how much.    Start off with clear liquids and soup. They are easier to digest.    Next try semi-solid foods, such as mashed potatoes, applesauce, and gelatin, as you feel ready.    Slowly move to solid foods. Don t eat fatty, rich, or spicy foods at first.    Do not force yourself to have 3 large meals a day. Instead eat smaller amounts more often.    Take pain medicines with a small amount of solid food, such as crackers or toast, to avoid nausea.     Call your surgeon if     You still have pain an hour after taking medicine. The medicine may not be strong enough.    You feel too sleepy, dizzy, or groggy. The medicine may be too strong.    You have side effects like nausea, vomiting, or skin changes, such as rash, itching, or hives.       If you have obstructive sleep apnea  You were given anesthesia medicine during surgery to keep you comfortable and free of pain. After surgery, you may have more apnea spells because of this medicine and other medicines you were given. The spells may last longer than usual.   At home:    Keep using the continuous positive airway pressure (CPAP) device when you sleep. Unless your healthcare provider tells you not to, use it when you sleep, day or night. CPAP is a common device used to treat obstructive sleep apnea.    Talk with your provider before taking any pain medicine, muscle  relaxants, or sedatives. Your provider will tell you about the possible dangers of taking these medicines.  Date Last Reviewed: 12/1/2016 2000-2017 [employer]. [address] [place] [address], [place] [address]. All rights reserved. This information is not intended as a substitute for professional medical care. Always follow your healthcare professional's instructions.                Managing Post-Op Pain at Home: Medicines    Pain after an operation (post-op pain) is common and expected. These guidelines can help you stay as comfortable as possible.  Taking pain medicines    Take medicines on time. Do not take more than prescribed.    Take only the medicines that your healthcare provider tells you to take.    Take pain medicines with some food to avoid an upset stomach.    Don t drink alcohol while using pain medicines.    Do not drive while taking opioid pain medicines.   Types of pain medicines  Non-opioid    Over-the-counter (such as acetaminophen and ibuprofen) or prescription    All relieve mild to moderate pain and some reduce swelling    Possible side effects include stomach upset and bleeding, high doses may cause kidney or liver problems    Check with your healthcare provider before taking over-the-counter pain medicines in addition to your prescribed pain medicine  Opioid    Always a prescription    Relieve moderate to severe pain    Possible side effects include stomach upset, nausea, and itching    May cause constipation (to help prevent this, eat high-fiber foods and drink plenty of water)    Your healthcare provider may recommend a stool softener  When to call your healthcare provider  Call your healthcare provider or seek immediate attention if you notice any of these symptoms:    Nausea, vomiting, diarrhea, lasting constipation, or stomach cramps    Breathing problems or a fast heart rate    Feeling very tired, sluggish, or dizzy    Skin rash   Date Last Reviewed: 12/1/2016 2000-2017 The  [employer]. [address] [place] [address], [place] [address]. All rights reserved. This information is not intended as a substitute for professional medical care. Always follow your healthcare professional's instructions.

## 2018-01-28 NOTE — ED AVS SNAPSHOT
Emergency Department    [address] [facility] [address]    Phone:  [phone]    Fax:  [phone]                                       Abigail Whitley   MRN: [id number]    Department:   Emergency Department   Date of Visit:  1/27/2018           Patient Information     Date Of Birth          1960        Your diagnoses for this visit were:     Penis injury, initial encounter        You were seen by Félix Michel MD.      Follow-up Information     Schedule an appointment as soon as possible for a visit with Christophe Sebastian MD.    Specialty:  Urology    Contact information:    [facility] [place]  [address] [facility] [place]  [facility] [address] [phone]          Follow up with  Emergency Department.    Specialty:  EMERGENCY MEDICINE    Why:  If symptoms worsen    Contact information:    [address] [facility] [id number] [phone]      24 Hour Appointment Hotline       To make an appointment at any [facility], call [phone] ([phone]). If you don't have a family doctor or clinic, we will help you find one. [facility] clinics are conveniently located to serve the needs of you and your family.             Review of your medicines      Notice     You have not been prescribed any medications.            Orders Needing Specimen Collection     None      Pending Results     No orders found from 1/26/2018 to 1/29/2018.            Pending Culture Results     No orders found from 1/26/2018 to 1/29/2018.            Pending Results Instructions     If you had any lab results that were not finalized at the time of your Discharge, you can call the ED Lab Result RN at [phone]. You will be contacted by this team for any positive Lab results or changes in treatment. The nurses are available 7 days a week from 10A to 6:30P.  You can leave a message 24 hours per day and they will return your call.        Test Results From Your Hospital Stay                Clinical Quality Measure: Blood Pressure Screening     Your blood pressure was checked while you were in the emergency department today. The last reading we obtained was  BP: 122/84 . Please read the guidelines below about what these numbers mean and what you should do about them.  If your systolic blood pressure (the top number) is less than 120 and your diastolic blood pressure (the bottom number) is less than 80, then your blood pressure is normal. There is nothing more that you need to do about it.  If your systolic blood pressure (the top number) is 120-139 or your diastolic blood pressure (the bottom number) is 80-89, your blood pressure may be higher than it should be. You should have your blood pressure rechecked within a year by a primary care provider.  If your systolic blood pressure (the top number) is 140 or greater or your diastolic blood pressure (the bottom number) is 90 or greater, you may have high blood pressure. High blood pressure is treatable, but if left untreated over time it can put you at risk for heart attack, stroke, or kidney failure. You should have your blood pressure rechecked by a primary care provider within the next 4 weeks.  If your provider in the emergency department today gave you specific instructions to follow-up with your doctor or provider even sooner than that, you should follow that instruction and not wait for up to 4 weeks for your follow-up visit.        Thank you for choosing [facility]       Thank you for choosing [facility] for your care. Our goal is always to provide you with excellent care. Hearing back from our patients is one way we can continue to improve our services. Please take a few minutes to complete the written survey that you may receive in the mail after you visit with us. Thank you!        [facility] Information     [employer] lets you send messages to your doctor, view your test results, renew your prescriptions, schedule appointments and more. To sign up,  "go to [email or web address] . Click on \"Log in\" on the left side of the screen, which will take you to the Welcome page. Then click on \"Sign up Now\" on the right side of the page.     You will be asked to enter the access code listed below, as well as some personal information. Please follow the directions to create your username and password.     Your access code is: [email or web address]  Expires: 2018 12:55 AM     Your access code will  in 90 days. If you need help or a new code, please call your [facility] clinic or [phone].        Care EveryWhere ID     This is your Care EveryWhere ID. This could be used by other organizations to access your [facility] medical records  [id number]        Equal Access to Services     LUIS IBRAHIM : Ezra Stewart, jason lepe, wilber mohamud, isael ramirez. So [facility] [phone].    ATENCIÓN: Si habla español, tiene a aquino disposición servicios gratuitos de asistencia lingüística. Abdiel al [phone].    We comply with applicable federal civil rights laws and [place] laws. We do not discriminate on the basis of race, color, national origin, age, disability, sex, sexual orientation, or gender identity.            After Visit Summary       This is your record. Keep this with you and show to your community pharmacist(s) and doctor(s) at your next visit.                  "

## 2018-01-28 NOTE — PHARMACY-ADMISSION MEDICATION HISTORY
Admission medication history interview status for the 1/28/2018  admission is complete. See EPIC admission navigator for prior to admission medications     Medication history source reliability:Good    Actions taken by pharmacist (provider contacted, etc):Patient denies taking any Rx medications at home on a regular basis.     Additional medication history information not noted on PTA med list :  1) Patient took a one time dose of 400mg of ibuprofen today at 1200    Medication reconciliation/reorder completed by provider prior to medication history? No    Time spent in this activity: 5 minutes    Prior to Admission medications    Medication Sig Last Dose Taking? Auth Provider   GLUCOSAMINE-CHONDROITIN PO Take 1 tablet by mouth 2 times daily 1/27/2018 at Unknown time Yes Unknown, Entered By History
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
## 2018-01-28 NOTE — ED AVS SNAPSHOT
Emergency Department    [address] [place] [address]    Phone:  [phone]    Fax:  [phone]                                       Abigail Whitley   MRN: [id number]    Department:   Emergency Department   Date of Visit:  1/27/2018           After Visit Summary Signature Page     I have received my discharge instructions, and my questions have been answered. I have discussed any challenges I see with this plan with the nurse or doctor.    ..........................................................................................................................................  Patient/Patient Representative Signature      ..........................................................................................................................................  Patient Representative Print Name and Relationship to Patient    ..................................................               ................................................  Date                                            Time    ..........................................................................................................................................  Reviewed by Signature/Title    ...................................................              ..............................................  Date                                                            Time

## 2018-01-28 NOTE — ED PROVIDER NOTES
Visit Date:   01/28/2018      CHIEF COMPLAINT:  Penis injury.      HISTORY OF PRESENT ILLNESS:  This is a 57-year-old male who came in after he injured his penis while having sexual intercourse with his partner.  He indicates that he was in the process of having intercourse when he apparently bent the penis and had sharp pain immediately along with swelling to the right side of the penis.  He has ongoing pain and bruising to the penis.  However, prior to my seeing him, he was able to urinate without any discomfort or blood in his urine.  He denies any other injuries.      PAST MEDICAL HISTORY:  Negative.      MEDICATIONS:  None.      ALLERGIES:  NO KNOWN DRUG ALLERGIES.      SOCIAL HISTORY:  Denies smoking.  Does drink alcohol occasionally.      REVIEW OF SYSTEMS:  See HPI.  All others are negative.      PHYSICAL EXAMINATION:   VITAL SIGNS:  He is afebrile, 96.8, blood pressure 135/84, heart rate 69, respiratory rate of 16, satting 100% on room air.   GENERAL:  A pleasant gentleman who is awake, alert and nontoxic.   EYES:  Normal.   ENT: Normal.    CARDIAC:  Regular rate and rhythm, no murmurs.   RESPIRATORY:  Clear to auscultation.   GASTROINTESTINAL:  Positive bowel sounds.  Soft and nontender.   GENITOURINARY:  His penis does have some ecchymosis mainly along the right side which is slightly tender to palpation but no obvious deformities.  It is also soft.  Testicles are nontender.   EXTREMITIES:  Extremities are atraumatic x4.   NEUROLOGIC:  He is awake, alert and oriented x3.  Cranial nerves II-XII are intact.  Motor and sensation normal.   SKIN:  Normal.   PSYCHIATRIC:  Normal.      MEDICAL DECISION MAKING:  This is a 57-year-old male who came in for further evaluation of an injury to his penis.   My main concern was that he could have injured his urethra.  However, he is able to urinate without difficulty or blood in his urine.  Therefore, at this point I do not feel anything further needs to be done  emergently.  I do not believe that he warrants emergent urologic consultation.  He does not appear to have any signs of a compartment syndrome or the need for any type of drainage.  I recommended using ice and ibuprofen.  He did not want anything for pain.  I am providing him with the contact information for one of the urologists for followup.  He should return here with any concerns or worsening symptoms.      IMPRESSION:  Penis injury.               TIFFANY WOOD MD             D: 2018   T: 2018   MT: MD      Name:     RASHAWN SUTTON   MRN:      [id number]        Account:      [id number]   :      1960           Visit Date:   2018      Document: [id number]

## 2018-01-29 VITALS
SYSTOLIC BLOOD PRESSURE: 129 MMHG | OXYGEN SATURATION: 93 % | TEMPERATURE: 96.7 F | HEIGHT: 71 IN | HEART RATE: 77 BPM | BODY MASS INDEX: 28 KG/M2 | RESPIRATION RATE: 18 BRPM | DIASTOLIC BLOOD PRESSURE: 78 MMHG | WEIGHT: 200 LBS

## 2018-01-29 LAB — GLUCOSE BLDC GLUCOMTR-MCNC: 126 MG/DL (ref 70–99)

## 2018-01-29 PROCEDURE — 25000132 ZZH RX MED GY IP 250 OP 250 PS 637: Performed by: UROLOGY

## 2018-01-29 PROCEDURE — 00000146 ZZHCL STATISTIC GLUCOSE BY METER IP

## 2018-01-29 PROCEDURE — G0378 HOSPITAL OBSERVATION PER HR: HCPCS

## 2018-01-29 RX ORDER — CEPHALEXIN 500 MG/1
500 CAPSULE ORAL 2 TIMES DAILY
Qty: 14 CAPSULE | Refills: 0 | Status: SHIPPED | OUTPATIENT
Start: 2018-01-29

## 2018-01-29 RX ORDER — HYDROCODONE BITARTRATE AND ACETAMINOPHEN 5; 325 MG/1; MG/1
1-2 TABLET ORAL EVERY 4 HOURS PRN
Qty: 20 TABLET | Refills: 0 | Status: SHIPPED | OUTPATIENT
Start: 2018-01-29

## 2018-01-29 RX ADMIN — OXYCODONE HYDROCHLORIDE AND ACETAMINOPHEN 1 TABLET: 5; 325 TABLET ORAL at 07:51

## 2018-01-29 RX ADMIN — OXYCODONE HYDROCHLORIDE AND ACETAMINOPHEN 1 TABLET: 5; 325 TABLET ORAL at 03:08

## 2018-01-29 NOTE — PLAN OF CARE
Problem: Patient Care Overview  Goal: Discharge Needs Assessment  Outcome: No Change  Alert and oriented. VSS on RA. Reports mild pain, managed with percocet. No capno ordered. Dressing CDI. Bruising and swelling noted in groin area. Keen patent. Tolerating diet, advanced to regular.

## 2018-01-29 NOTE — OP NOTE
Procedure Date: 01/28/2018      PREOPERATIVE DIAGNOSES:  Penile fracture and hematoma.      POSTOPERATIVE DIAGNOSES:  Penile fracture and hematoma.      PROCEDURE PERFORMED:  Exploration, evacuation of hematoma and repair of penile fracture.      SURGEON:  Jean-Claude Sung MD      PREOPERATIVE STATUS:  Abigail Whitley is a 57-year-old male who presented to the emergency room with a possible penile fracture.  The patient on 01/27/2018 at 10:30 p.m. was having intercourse.  During intercourse, his penis got bent.  He felt sudden pain on the right side of the shaft of the penis with loss of his erection.  Following discussion with the patient as to the management, patient just elected to proceed with exploration and repair of possible fracture.  He understands the risks and benefits and any possible long-term complications arising from this.      DESCRIPTION OF PROCEDURE:  The patient was identified and brought to the operating room.  After adequate general anesthesia, he was placed in the supine position and prepped and draped in the usual sterile fashion.  A timeout was conducted.  This patient previously has undergone a circumcision.  I made a circumferential incision at his old circumcision scar.  The skin and subcutaneous tissue were dissected down to the Garcia's fascia.  The penis was then degloved.  I had a red Novoa catheter in the urethra.  On degloving the penis quite proximally right at the proximal right shaft just before the penile scrotal junction, I saw the hematoma.  The hematoma was evacuated at the bottom of the hematoma.  There was a 1 cm transverse tear in the tunica albuginea.  The hematoma was evacuated.  The area was irrigated with saline.  I then closed the tear in the tunica albuginea using 3-0 Vicryl suture.  Good hemostasis was then obtained.  The skin and subcutaneous tissue were then pulled down.  The incision was closed in 2 layers using 3-0 chromic catgut sutures for the subcutaneous tissue  and then 4-0 chromic catgut suture for the skin using both running and interrupted sutures.  At the end of the procedure, I placed a 16-English Keen catheter which passed easily.  The urine was clear.  I then applied bacitracin to the incision.  Marcaine 0.25% without epinephrine was infiltrated at the base of the penis for postoperative pain control.  I then placed a Coban dressing likely around the shaft of the penis.  The estimated blood loss was 10 mL.  The patient tolerated the procedure well and was sent to the recovery room in stable condition.         KATHIE GARCIA MD             D: 2018   T: 2018   MT: SHANDA      Name:     RASHAWN SUTTON   MRN:      [id number]        Account:        [id number]   :      1960           Procedure Date: 2018      Document: [id number]

## 2018-01-29 NOTE — PROCEDURES
[facility] Urology Brief Operative Note    Pre-operative diagnosis: Penile fracture and hematoma   Post-operative diagnosis: Same   Procedure: Procedure(s):  EXPLORATION, HEMATOMA EVACUATION, REPAIR OF PENILE FRACTURE - Wound Class: I-Clean   Surgeon: Jean-Claude Sung MD   Assistant(s): None   Anesthesia: General endotracheal anesthesia   Estimated blood loss: 10 mL   Total IV fluids: (See anesthesia record)   Blood transfusion: No transfusion was given during surgery   Total urine output: (See anesthesia record)   Drains: None   Specimens: None   Implants: None   Findings: 1 cm tear in the tunica albugenia rt proximal shaft   Complications: None   Condition: Stable   Comments: See dictated operative report for full details

## 2018-01-29 NOTE — ANESTHESIA POSTPROCEDURE EVALUATION
Patient: Abigail Whitley    Procedure(s):  EXPLORATION, HEMATOMA EVACUATION, REPAIR OF PENILE FRACTURE - Wound Class: I-Clean    Diagnosis:unknown  Diagnosis Additional Information: No value filed.    Anesthesia Type:  General, ETT    Note:  Anesthesia Post Evaluation    Patient location during evaluation: PACU  Patient participation: Able to fully participate in evaluation  Level of consciousness: awake  Pain management: adequate  Airway patency: patent  Cardiovascular status: acceptable  Respiratory status: acceptable  Hydration status: acceptable  PONV: none     Anesthetic complications: None          Last vitals:  Vitals:    01/28/18 2150 01/28/18 2200 01/28/18 2210   BP: (!) 145/93 (!) 165/111 (!) 147/98   Pulse:      Resp: 12 10 14   Temp: 36  C (96.8  F) 36.2  C (97.2  F) 36.3  C (97.3  F)   SpO2: 100% 99% 97%         Electronically Signed By: DEANA PECK MD  January 28, 2018  10:17 PM

## 2018-01-29 NOTE — PLAN OF CARE
Problem: Patient Care Overview  Goal: Plan of Care/Patient Progress Review  Outcome: Adequate for Discharge Date Met: 01/29/18  VS stable, up ad thais, pt is able to void small amount at first and better now after adequate amount po fluid intake. Pt is adequate to be discharged, instruction given to pt. Pt verbalized his understanding regarding instruction.

## 2018-01-29 NOTE — PROGRESS NOTES
Chart check non-billing    This patient was discharged by Urology prior to my arrival.  Chart reviewed and agree with Urology plans.  No other active medical concerns at this time.    Caesar Jonas PA-C

## 2018-01-29 NOTE — PROGRESS NOTES
UROLOGY   CLINICALLY STABLE, V.S.S., NO REAL PAIN, URINE CLEAR. MATILDE DIET.    BRUISING TO BASE OF PENIS, SUPRAPUBIC AREA. SCROTUM. DRESSING LOOKS GOOD. GLANS VIABLE.   A- PENILE FRACTURE, S/P REPAIR  P-HOME TODAY, REMOVE CARVALHO, IF NO PROBLEMS ,LEAVE DRESSINGS TILL WED. PAIN MEDS, ANTIBIOTICS.

## 2018-01-29 NOTE — ANESTHESIA CARE TRANSFER NOTE
Patient: Abigail Whitley    Procedure(s):  EXPLORATION, HEMATOMA EVACUATION, REPAIR OF PENILE FRACTURE - Wound Class: I-Clean    Diagnosis: unknown  Diagnosis Additional Information: No value filed.    Anesthesia Type:   General, ETT     Note:  Airway :Face Mask  Patient transferred to:PACU  Comments:     Transferred to  PACU RN. Patient awake and verbal. Spontaneous resp. Monitors and alarms on. VSS. Report given.      Vitals: (Last set prior to Anesthesia Care Transfer)    CRNA VITALS  1/28/2018 2105 - 1/28/2018 2141 1/28/2018             Resp Rate (observed): (!)  1    Resp Rate (set): 10                Electronically Signed By: SAM Sorensen CRNA  January 28, 2018  9:41 PM

## 2018-01-29 NOTE — ANESTHESIA PREPROCEDURE EVALUATION
Anesthesia Evaluation     .             ROS/MED HX    ENT/Pulmonary:     (+)DANIS risk factors snores loudly, hypertension, tobacco use, Past use , . .   (-) sleep apnea   Neurologic:       Cardiovascular:     (+) hypertension----. : . . . :. .       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    ASA Status:  2 .    NPO Status:  > 8 hours    Plan for General and ETT with Intravenous induction. Maintenance will be Balanced.    PONV prophylaxis:  Ondansetron (or other 5HT-3) and Dexamethasone or Solumedrol       Postoperative Care  Postoperative pain management:  IV analgesics.      Consents  Anesthetic plan, risks, benefits and alternatives discussed with:  Patient..                          .

## 2018-04-23 NOTE — DISCHARGE SUMMARY
[facility]    Discharge Summary  Urology    Date of Admission:  1/28/2018  Date of Discharge:  1/29/2018 12:26 PM  Discharging Provider: Rebecca Long PA-C    Discharge Diagnoses   Active Problems:    Penile fracture      History of Present Illness   Abigail Whitley is an 57 year old male who presented with penile hematoma/fracture on 1/28/18 and underwent surgical repair of fracture and hematoma evacuation with Dr. Sung on 1/28.     Hospital Course   Abigail Whitley was admitted on 1/28/2018.  The following problems were addressed during his hospitalization: penile fracture and hematoma. Risks and benefits of surgery were reviewed and patient opted to undergo the procedure. Perioperative and hospital course were unremarkable. Pain remained controlled. Keen was removed prior to discharge. Vitals remained stable.     Plan: Home with pain meds and antibiotics course.   Dressings to remain in place x 1-2 days.   Follow up prn.     Rebecca Long PA-C  [facility]  Pager: [phone]  Office: [phone]    Significant Results and Procedures   Repair of penile fracture and hematoma evacuation for penile fracture and hematoma.     Pending Results   These results will be followed up by Dr Sung  Unresulted Labs Ordered in the Past 30 Days of this Admission     No orders found from 11/29/2017 to 1/29/2018.          Code Status   Full Code    Primary Care Physician   Caesar Rasheed        Time Spent on this Encounter   I, Rebecca Long, discharged this patient today but I did not personally see the patient today and will not be billing for the patient's discharge.    Discharge Disposition   Discharged to home  Condition at discharge: Stable    Consultations This Hospital Stay   None    Discharge Orders     No lifting over 15 pounds and no strenuous physical activity   for 2weeks     Dressing   Keep dressing clean and dry, change as instructed by Surgeon or RN. WILL REMOVE ON  WED.[place] OFFICE     Discharge Instructions   Patient to arrange for follow up appointment in 2 days. SEE DR GARCIA ON WED , [place] OFFICE TO REMOVE DRESSINGS. CALL [phone]       Discharge Medications   Discharge Medication List as of 1/29/2018 11:13 AM      START taking these medications    Details   cephALEXin (KEFLEX) 500 MG capsule Take 1 capsule (500 mg) by mouth 2 times daily, Disp-14 capsule, R-0, E-Prescribe      HYDROcodone-acetaminophen (NORCO) 5-325 MG per tablet Take 1-2 tablets by mouth every 4 hours as needed for moderate to severe pain (Moderate to Severe Pain), Disp-20 tablet, R-0, Local Print         CONTINUE these medications which have NOT CHANGED    Details   GLUCOSAMINE-CHONDROITIN PO Take 1 tablet by mouth 2 times daily, Historical           Allergies   No Known Allergies  Data   Most Recent 3 CBC's:  Recent Labs   Lab Test  01/28/18   1633   WBC  7.1   HGB  15.2   MCV  91   PLT  293      Most Recent 3 BMP's:  Recent Labs   Lab Test  01/28/18   1633   NA  140   POTASSIUM  4.1   CHLORIDE  106   CO2  29   BUN  20   CR  0.93   ANIONGAP  5   BRYNN  8.7   GLC  98     Most Recent 2 LFT's:No lab results found.  Most Recent INR's and Anticoagulation Dosing History:  Anticoagulation Dose History     Recent Dosing and Labs Latest Ref Rng & Units 1/28/2018    INR 0.86 - 1.14 0.95        Most Recent 3 Troponin's:No lab results found.  Most Recent Cholesterol Panel:No lab results found.  Most Recent 6 Bacteria Isolates From Any Culture (See EPIC Reports for Culture Details):No lab results found.  Most Recent TSH, T4 and A1c Labs:No lab results found.  No results found for this or any previous visit.

## (undated) DEVICE — SU VICRYL 4-0 RB-1 27" J304

## (undated) DEVICE — SOL NACL 0.9% IRRIG 1000ML BOTTLE 07138-09

## (undated) DEVICE — MANIFOLD NEPTUNE 4 PORT 700-20

## (undated) DEVICE — SUCTION TIP YANKAUER W/O VENT K86

## (undated) DEVICE — LINEN TOWEL PACK X5 5464

## (undated) DEVICE — GLOVE PROTEXIS W/NEU-THERA 8.0  2D73TE80

## (undated) DEVICE — SU CHROMIC 3-0 SH 27" G122H

## (undated) DEVICE — CATH TRAY FOLEY COUDE SURESTEP 16FR W/URNE MTR STLK A304716A

## (undated) DEVICE — PREP SKIN SCRUB TRAY 4461A

## (undated) DEVICE — Device

## (undated) DEVICE — PACK MINOR SBA15MIFSE

## (undated) DEVICE — DRAIN PENROSE 0.25"X18" LATEX FREE GR201

## (undated) DEVICE — GLOVE PROTEXIS BLUE W/NEU-THERA 8.0  2D73EB80

## (undated) DEVICE — SYR BULB IRRIG 50ML LATEX FREE 0035280

## (undated) DEVICE — SU VICRYL 3-0 SH 27" J316H

## (undated) DEVICE — BLADE CLIPPER SGL USE 9680

## (undated) DEVICE — DRAPE LAP W/ARMBOARD 29410

## (undated) RX ORDER — HYDROMORPHONE HYDROCHLORIDE 1 MG/ML
INJECTION, SOLUTION INTRAMUSCULAR; INTRAVENOUS; SUBCUTANEOUS
Status: DISPENSED
Start: 2018-01-28

## (undated) RX ORDER — PROPOFOL 10 MG/ML
INJECTION, EMULSION INTRAVENOUS
Status: DISPENSED
Start: 2018-01-28

## (undated) RX ORDER — ONDANSETRON 2 MG/ML
INJECTION INTRAMUSCULAR; INTRAVENOUS
Status: DISPENSED
Start: 2018-01-28

## (undated) RX ORDER — BUPIVACAINE HYDROCHLORIDE 2.5 MG/ML
INJECTION, SOLUTION EPIDURAL; INFILTRATION; INTRACAUDAL
Status: DISPENSED
Start: 2018-01-28

## (undated) RX ORDER — FENTANYL CITRATE 50 UG/ML
INJECTION, SOLUTION INTRAMUSCULAR; INTRAVENOUS
Status: DISPENSED
Start: 2018-01-28

## (undated) RX ORDER — DEXAMETHASONE SODIUM PHOSPHATE 4 MG/ML
INJECTION, SOLUTION INTRA-ARTICULAR; INTRALESIONAL; INTRAMUSCULAR; INTRAVENOUS; SOFT TISSUE
Status: DISPENSED
Start: 2018-01-28

## (undated) RX ORDER — GINSENG 100 MG
CAPSULE ORAL
Status: DISPENSED
Start: 2018-01-28

## (undated) RX ORDER — LIDOCAINE HYDROCHLORIDE 20 MG/ML
INJECTION, SOLUTION EPIDURAL; INFILTRATION; INTRACAUDAL; PERINEURAL
Status: DISPENSED
Start: 2018-01-28